# Patient Record
Sex: FEMALE | Race: WHITE | Employment: OTHER | URBAN - METROPOLITAN AREA
[De-identification: names, ages, dates, MRNs, and addresses within clinical notes are randomized per-mention and may not be internally consistent; named-entity substitution may affect disease eponyms.]

---

## 2017-01-09 ENCOUNTER — OFFICE VISIT (OUTPATIENT)
Dept: FAMILY MEDICINE | Facility: CLINIC | Age: 82
End: 2017-01-09
Payer: COMMERCIAL

## 2017-01-09 VITALS
HEIGHT: 66 IN | BODY MASS INDEX: 25.39 KG/M2 | DIASTOLIC BLOOD PRESSURE: 64 MMHG | WEIGHT: 158 LBS | TEMPERATURE: 97.5 F | HEART RATE: 84 BPM | SYSTOLIC BLOOD PRESSURE: 130 MMHG

## 2017-01-09 DIAGNOSIS — E03.9 ACQUIRED HYPOTHYROIDISM: ICD-10-CM

## 2017-01-09 DIAGNOSIS — G30.9 ALZHEIMER'S DEMENTIA WITHOUT BEHAVIORAL DISTURBANCE, UNSPECIFIED TIMING OF DEMENTIA ONSET: ICD-10-CM

## 2017-01-09 DIAGNOSIS — F02.80 ALZHEIMER'S DEMENTIA WITHOUT BEHAVIORAL DISTURBANCE, UNSPECIFIED TIMING OF DEMENTIA ONSET: ICD-10-CM

## 2017-01-09 DIAGNOSIS — E03.9 HYPOTHYROIDISM, UNSPECIFIED TYPE: Primary | ICD-10-CM

## 2017-01-09 LAB — HGB BLD-MCNC: 14.9 G/DL (ref 11.7–15.7)

## 2017-01-09 PROCEDURE — 99214 OFFICE O/P EST MOD 30 MIN: CPT | Performed by: FAMILY MEDICINE

## 2017-01-09 PROCEDURE — 84439 ASSAY OF FREE THYROXINE: CPT | Performed by: FAMILY MEDICINE

## 2017-01-09 PROCEDURE — 84443 ASSAY THYROID STIM HORMONE: CPT | Performed by: FAMILY MEDICINE

## 2017-01-09 PROCEDURE — 85018 HEMOGLOBIN: CPT | Performed by: FAMILY MEDICINE

## 2017-01-09 PROCEDURE — 36415 COLL VENOUS BLD VENIPUNCTURE: CPT | Performed by: FAMILY MEDICINE

## 2017-01-09 RX ORDER — LEVOTHYROXINE SODIUM 50 UG/1
50 TABLET ORAL DAILY
Qty: 90 TABLET | Refills: 2 | Status: SHIPPED | OUTPATIENT
Start: 2017-01-09

## 2017-01-09 NOTE — PROGRESS NOTES
SUBJECTIVE:                                                    Peggy Muniz is a 81 year old female who presents to clinic today for the following health issues:      Hypothyroidism Follow-up      Since last visit, patient describes the following symptoms: Weight stable, no hair loss, no skin changes, no constipation, no loose stools       Amount of exercise or physical activity: walks     Problems taking medications regularly: Yes,  problems remembering to take    Medication side effects: none    Diet: regular (no restrictions)    Here with her daughter  Taking meds regularly    Living in a senior place, doing well    Dementia-stable on aricept per daughter, no changes, no illnesses, eating well, not skipping meals, no mood changes          Problem list and histories reviewed & adjusted, as indicated.  Additional history: as documented    Patient Active Problem List   Diagnosis     Hypothyroidism     Benign neoplasm of colon     Other specified idiopathic peripheral neuropathy     Venous (peripheral) insufficiency     Hyperlipidemia LDL goal <130     Other diseases of nasal cavity and sinuses(478.19)     Shoulder pain     Ear problem     Advance Care Planning     Alzheimer's dementia without behavioral disturbance, unspecified timing of dementia onset     History reviewed. No pertinent past surgical history.    Social History   Substance Use Topics     Smoking status: Former Smoker     Quit date: 07/01/1989     Smokeless tobacco: Never Used     Alcohol Use: Yes      Comment: rarely     Family History   Problem Relation Age of Onset     Breast Cancer Mother      CANCER Mother      ovarian ca     C.A.D. Mother      CEREBROVASCULAR DISEASE Mother      CANCER Father      mouth     C.A.D. Brother      Hypertension Brother      Alzheimer Disease Brother      Respiratory Brother            ROS:  Constitutional, HEENT, cardiovascular, pulmonary, GI, , musculoskeletal, neuro, skin, endocrine and psych systems are  "negative, except as otherwise noted.    OBJECTIVE:                                                    /64 mmHg  Pulse 84  Temp(Src) 97.5  F (36.4  C) (Oral)  Ht 5' 5.5\" (1.664 m)  Wt 158 lb (71.668 kg)  BMI 25.88 kg/m2  Breastfeeding? No  Body mass index is 25.88 kg/(m^2).  GENERAL: healthy, alert and no distress  NECK: no adenopathy, no asymmetry, masses, or scars and thyroid normal to palpation  RESP: lungs clear to auscultation - no rales, rhonchi or wheezes  CV: regular rate and rhythm, normal S1 S2, no S3 or S4, no murmur, click or rub, no peripheral edema and peripheral pulses strong  ABDOMEN: soft, nontender, no hepatosplenomegaly, no masses and bowel sounds normal  MS: no gross musculoskeletal defects noted, no edema    Diagnostic Test Results:  Results for orders placed or performed in visit on 01/09/17   Hemoglobin   Result Value Ref Range    Hemoglobin 14.9 11.7 - 15.7 g/dL        ASSESSMENT/PLAN:                                                        ICD-10-CM    1. Hypothyroidism, unspecified type E03.9 TSH with free T4 reflex     Hemoglobin   2. Acquired hypothyroidism E03.9 levothyroxine (SYNTHROID) 50 MCG tablet   3. Alzheimer's dementia without behavioral disturbance, unspecified timing of dementia onset G30.9     F02.80      Hypothyroidism-stable symptoms, advised taking meds regularly, family does not want to change dose, unless it is severely abnormal  Dementia-stable, on Aricept, cont meds for now  Follow up in 6-12 ,months    See Patient Instructions    Tim Keenan DO  [facility]    "

## 2017-01-09 NOTE — PATIENT INSTRUCTIONS
Continue med as planned  Recheck in 6-12 months  Follow up if having any symptoms  Tim Keenan D.O.

## 2017-01-09 NOTE — MR AVS SNAPSHOT
After Visit Summary   1/9/2017    Peggy Muniz    MRN: [id number]           Patient Information     Date Of Birth          1935        Visit Information        Provider Department      1/9/2017 10:00 AM Tim Keenan DO [facility]        Today's Diagnoses     Hypothyroidism, unspecified type    -  1     Acquired hypothyroidism         Alzheimer's dementia without behavioral disturbance, unspecified timing of dementia onset           Care Instructions    Continue med as planned  Recheck in 6-12 months  Follow up if having any symptoms  Tim Keenan D.O.          Follow-ups after your visit        Who to contact     If you have questions or need follow up information about today's clinic visit or your schedule please contact [facility] directly at [phone].  Normal or non-critical lab and imaging results will be communicated to you by MyChart, letter or phone within 4 business days after the clinic has received the results. If you do not hear from us within 7 days, please contact the clinic through MyChart or phone. If you have a critical or abnormal lab result, we will notify you by phone as soon as possible.  Submit refill requests through [employer] or call your pharmacy and they will forward the refill request to us. Please allow 3 business days for your refill to be completed.          Additional Information About Your Visit        MyChart Information     [employer] gives you secure access to your electronic health record. If you see a primary care provider, you can also send messages to your care team and make appointments. If you have questions, please call your primary care clinic.  If you do not have a primary care provider, please call [phone] and they will assist you.        Care EveryWhere ID     This is your Care EveryWhere ID. This could be used by other organizations to access your [facility] medical records  [id number]       "  Your Vitals Were     Pulse Temperature Height BMI (Body Mass Index) Breastfeeding?       84 97.5  F (36.4  C) (Oral) 5' 5.5\" (1.664 m) 25.88 kg/m2 No        Blood Pressure from Last 3 Encounters:   01/09/17 130/64   08/17/16 114/60   05/20/16 131/62    Weight from Last 3 Encounters:   01/09/17 158 lb (71.668 kg)   08/17/16 167 lb 8 oz (75.978 kg)   05/20/16 165 lb 3.2 oz (74.934 kg)              We Performed the Following     Hemoglobin     TSH with free T4 reflex          Today's Medication Changes          These changes are accurate as of: 1/9/17 10:28 AM.  If you have any questions, ask your nurse or doctor.               Stop taking these medicines if you haven't already. Please contact your care team if you have questions.     RESTASIS 0.05 % ophthalmic emulsion   Generic drug:  cycloSPORINE   Stopped by:  Tim Keenan, DO                Where to get your medicines      These medications were sent to [id number] - [facility] [address]  [address] [address]     Phone:  [phone]    - levothyroxine 50 MCG tablet             Primary Care Provider Office Phone # Fax #    Ciarra Whitt [phone]       [facility] [address] [place] [id number]        Thank you!     Thank you for choosing [facility]  for your care. Our goal is always to provide you with excellent care. Hearing back from our patients is one way we can continue to improve our services. Please take a few minutes to complete the written survey that you may receive in the mail after your visit with us. Thank you!             Your Updated Medication List - Protect others around you: Learn how to safely use, store and throw away your medicines at www.disposemymeds.org.          This list is accurate as of: 1/9/17 10:28 AM.  Always use your most recent med list.                   Brand Name Dispense Instructions for use    " CLARITIN-D 24 HOUR  MG per 24 hr tablet   Generic drug:  loratadine-pseudoePHEDrine     30    1 tab po QD (Once per day) as needed for allergy symptoms       * donepezil 10 MG tablet    ARICEPT    30 tablet    Take 1 tablet (10 mg) by mouth At Bedtime       * donepezil 5 MG tablet    ARICEPT    30 tablet    Take 1 tablet (5 mg) by mouth At Bedtime       levothyroxine 50 MCG tablet    SYNTHROID    90 tablet    Take 1 tablet (50 mcg) by mouth daily Go to 1/9 appt for refills!       multivitamin Tabs tablet      1 tablet daily       zoster vaccine live (PF) injection    ZOSTAVAX    78754 Units    Inject 19,400 Units Subcutaneous once for 1 dose.       * Notice:  This list has 2 medication(s) that are the same as other medications prescribed for you. Read the directions carefully, and ask your doctor or other care provider to review them with you.

## 2017-01-09 NOTE — NURSING NOTE
"Chief Complaint   Patient presents with     Chronic Disease Management     thyroids        Initial /64 mmHg  Pulse 84  Temp(Src) 97.5  F (36.4  C) (Oral)  Ht 5' 5.5\" (1.664 m)  Wt 158 lb (71.668 kg)  BMI 25.88 kg/m2  Breastfeeding? No Estimated body mass index is 25.88 kg/(m^2) as calculated from the following:    Height as of this encounter: 5' 5.5\" (1.664 m).    Weight as of this encounter: 158 lb (71.668 kg).  BP completed using cuff size: anthony Fernandez CMA (AAMA)      "

## 2017-01-10 LAB
T4 FREE SERPL-MCNC: 1.1 NG/DL (ref 0.76–1.46)
TSH SERPL DL<=0.005 MIU/L-ACNC: 7.12 MU/L (ref 0.4–4)

## 2017-04-13 ENCOUNTER — TELEPHONE (OUTPATIENT)
Dept: FAMILY MEDICINE | Facility: CLINIC | Age: 82
End: 2017-04-13

## 2017-04-13 NOTE — TELEPHONE ENCOUNTER
Forms received from [employer] for Hellina Shlomo DO.  Forms placed in provider 'sign me' folder.  Please fax forms to [phone] after completion.    Petrona Hines  Patient Representative

## 2017-04-14 NOTE — TELEPHONE ENCOUNTER
Form completed and faxed.    Jennifer GARRISON, Certified Medical Assistant (AAMA)April 14, 2017 3:26 PM

## 2017-04-19 ENCOUNTER — TELEPHONE (OUTPATIENT)
Dept: FAMILY MEDICINE | Facility: CLINIC | Age: 82
End: 2017-04-19

## 2017-04-19 NOTE — TELEPHONE ENCOUNTER
Physician orders received.  Given to Josefina Sandra RN for med rec.  Please give to provider for review and signature upon completion.    Fax to: [phone]      Petrona Hines  Patient Representative

## 2017-04-21 ENCOUNTER — MEDICAL CORRESPONDENCE (OUTPATIENT)
Dept: HEALTH INFORMATION MANAGEMENT | Facility: CLINIC | Age: 82
End: 2017-04-21

## 2017-04-21 NOTE — TELEPHONE ENCOUNTER
[facility]    Jennifer GARRISON, Certified Medical Assistant (AAMA)April 21, 2017 4:14 PM

## 2017-04-24 ENCOUNTER — TELEPHONE (OUTPATIENT)
Dept: FAMILY MEDICINE | Facility: CLINIC | Age: 82
End: 2017-04-24

## 2017-04-25 ENCOUNTER — TELEPHONE (OUTPATIENT)
Dept: FAMILY MEDICINE | Facility: CLINIC | Age: 82
End: 2017-04-25

## 2017-04-25 NOTE — TELEPHONE ENCOUNTER
Reason for Call:  Other appointment    Detailed comments: She is calling to speak with a nurse about the appointment on 5/2. They would like to get her in sooner. She would also like someone who know a lot about dementia.     Phone Number Patient can be reached at: Home number on file [phone] (home)    Best Time: Anytime     Can we leave a detailed message on this number? YES    Call taken on 4/25/2017 at 4:15 PM by Fany Pearce

## 2017-04-25 NOTE — TELEPHONE ENCOUNTER
Informed ABY [facility]. She states niece needs to bring her, left a VM with Rae with our phone number & the scheduling number.  Gemini Poole RN

## 2017-04-26 ENCOUNTER — OFFICE VISIT (OUTPATIENT)
Dept: FAMILY MEDICINE | Facility: CLINIC | Age: 82
End: 2017-04-26
Payer: COMMERCIAL

## 2017-04-26 VITALS
HEART RATE: 80 BPM | BODY MASS INDEX: 24.91 KG/M2 | TEMPERATURE: 98.3 F | SYSTOLIC BLOOD PRESSURE: 130 MMHG | HEIGHT: 66 IN | DIASTOLIC BLOOD PRESSURE: 64 MMHG | WEIGHT: 155 LBS

## 2017-04-26 DIAGNOSIS — F02.81 ALZHEIMER'S DEMENTIA WITH BEHAVIORAL DISTURBANCE, UNSPECIFIED TIMING OF DEMENTIA ONSET: Primary | ICD-10-CM

## 2017-04-26 DIAGNOSIS — G30.9 ALZHEIMER'S DEMENTIA WITH BEHAVIORAL DISTURBANCE, UNSPECIFIED TIMING OF DEMENTIA ONSET: Primary | ICD-10-CM

## 2017-04-26 PROCEDURE — 99214 OFFICE O/P EST MOD 30 MIN: CPT | Performed by: PHYSICIAN ASSISTANT

## 2017-04-26 RX ORDER — SERTRALINE HYDROCHLORIDE 25 MG/1
25 TABLET, FILM COATED ORAL DAILY
Qty: 30 TABLET | Refills: 1 | Status: SHIPPED | OUTPATIENT
Start: 2017-04-26 | End: 2017-05-02 | Stop reason: DRUGHIGH

## 2017-04-26 NOTE — NURSING NOTE
"Chief Complaint   Patient presents with     Behavioral Problem     Aggressive Behavior       Initial /64 (Cuff Size: Adult Regular)  Pulse 80  Temp 98.3  F (36.8  C) (Oral)  Ht 5' 5.5\" (1.664 m)  Wt 155 lb (70.3 kg)  BMI 25.4 kg/m2 Estimated body mass index is 25.4 kg/(m^2) as calculated from the following:    Height as of this encounter: 5' 5.5\" (1.664 m).    Weight as of this encounter: 155 lb (70.3 kg).  Medication Reconciliation: complete   Nora Yuan, Certified Medical Assistant (AAMA)     "
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
## 2017-04-26 NOTE — MR AVS SNAPSHOT
After Visit Summary   4/26/2017    Peggy Muniz    MRN: [id number]           Patient Information     Date Of Birth          1935        Visit Information        Provider Department      4/26/2017 12:20 PM Nancy Dorman PA-C [facility]        Today's Diagnoses     Alzheimer's dementia without behavioral disturbance, unspecified timing of dementia onset    -  1    Agitation           Follow-ups after your visit        Additional Services     CARE COORDINATION REFERRAL       Services are provided by a Care Coordinator for people with complex needs such as: medical, social, or financial troubles.  The Care Coordinator works with the patient and their Primary Care Provider to determine health goals, obtain resources, achieve outcomes, and develop care plans that help coordinate the patient's care.     Nurse's name is TERE Burnett Director, [facility] [phone]  Cell [phone]  Reason for Referral: Caregiver Concerns and Mental Status/Behavioral Changes    Provide additional details for Care Coordination to best meet the patient's current needs: Peggy is a new resident at [facility]. Is having aggressive/violent behavior with her move.   This is new with her move, which can occur when surroundings are changed, etc.  Nurse would like to find a Geriatrician that she can take Peggy too to manage these new behaviors.  I had also suggested OT/PT at the nursing home as with aggression aren't always helpful, rather behavior modifications, sensory and music therapy, etc.  Any help with geriatrician or other AD resources would be helpful    Clinical Staff have discussed the Care Coordination Referral with the patient and/or caregiver: yes                  Who to contact     If you have questions or need follow up information about today's clinic visit or your schedule please contact [facility] directly at [phone].  Normal or non-critical  "lab and imaging results will be communicated to you by MyChart, letter or phone within 4 business days after the clinic has received the results. If you do not hear from us within 7 days, please contact the clinic through [employer] or phone. If you have a critical or abnormal lab result, we will notify you by phone as soon as possible.  Submit refill requests through [employer] or call your pharmacy and they will forward the refill request to us. Please allow 3 business days for your refill to be completed.          Additional Information About Your Visit        [employer] Information     [employer] gives you secure access to your electronic health record. If you see a primary care provider, you can also send messages to your care team and make appointments. If you have questions, please call your primary care clinic.  If you do not have a primary care provider, please call [phone] and they will assist you.        Care EveryWhere ID     This is your Care EveryWhere ID. This could be used by other organizations to access your [facility] medical records  [id number]        Your Vitals Were     Pulse Temperature Height BMI (Body Mass Index)          80 98.3  F (36.8  C) (Oral) 5' 5.5\" (1.664 m) 25.4 kg/m2         Blood Pressure from Last 3 Encounters:   04/26/17 130/64   01/09/17 130/64   08/17/16 114/60    Weight from Last 3 Encounters:   04/26/17 155 lb (70.3 kg)   01/09/17 158 lb (71.7 kg)   08/17/16 167 lb 8 oz (76 kg)              We Performed the Following     CARE COORDINATION REFERRAL          Today's Medication Changes          These changes are accurate as of: 4/26/17  2:15 PM.  If you have any questions, ask your nurse or doctor.               Start taking these medicines.        Dose/Directions    sertraline 25 MG tablet   Commonly known as:  ZOLOFT   Used for:  Alzheimer's dementia without behavioral disturbance, unspecified timing of dementia onset, Agitation   Started by:  Nancy Dorman PA-C        Dose: "  25 mg   Take 1 tablet (25 mg) by mouth daily   Quantity:  30 tablet   Refills:  1            Where to get your medicines      These medications were sent to [facility][id number] - [place], [place] - [address]  [address] [place] [address]     Phone:  [phone]     sertraline 25 MG tablet                Primary Care Provider Office Phone # Fax #    Tim Keenan -[phone]       [facility] [address] [address]        Thank you!     Thank you for choosing [facility]  for your care. Our goal is always to provide you with excellent care. Hearing back from our patients is one way we can continue to improve our services. Please take a few minutes to complete the written survey that you may receive in the mail after your visit with us. Thank you!             Your Updated Medication List - Protect others around you: Learn how to safely use, store and throw away your medicines at www.disposemymeds.org.          This list is accurate as of: 4/26/17  2:15 PM.  Always use your most recent med list.                   Brand Name Dispense Instructions for use    donepezil 5 MG tablet    ARICEPT    30 tablet    Take 1 tablet (5 mg) by mouth At Bedtime       levothyroxine 50 MCG tablet    SYNTHROID    90 tablet    Take 1 tablet (50 mcg) by mouth daily Go to 1/9 appt for refills!       sertraline 25 MG tablet    ZOLOFT    30 tablet    Take 1 tablet (25 mg) by mouth daily

## 2017-04-28 ENCOUNTER — TELEPHONE (OUTPATIENT)
Dept: FAMILY MEDICINE | Facility: CLINIC | Age: 82
End: 2017-04-28

## 2017-05-01 ENCOUNTER — TELEPHONE (OUTPATIENT)
Dept: FAMILY MEDICINE | Facility: CLINIC | Age: 82
End: 2017-05-01

## 2017-05-01 DIAGNOSIS — G30.9 ALZHEIMER'S DEMENTIA WITH BEHAVIORAL DISTURBANCE, UNSPECIFIED TIMING OF DEMENTIA ONSET: Primary | ICD-10-CM

## 2017-05-01 DIAGNOSIS — F02.81 ALZHEIMER'S DEMENTIA WITH BEHAVIORAL DISTURBANCE, UNSPECIFIED TIMING OF DEMENTIA ONSET: Primary | ICD-10-CM

## 2017-05-01 NOTE — TELEPHONE ENCOUNTER
"Called and spoke with Rae. She states that the zoloft is not working. The doctor at the home says that it is strong enough and needs an increased dose or something else because 25 mg is \"not enough\".       Iftikhar Monroe RN    "

## 2017-05-01 NOTE — TELEPHONE ENCOUNTER
Reason for Call:  Other prescription    Detailed comments: Rae called to see if the patient's medications can either be upped or changed due to it not working for her.    Phone Number Patient can be reached at: Other phone number:  [phone]    Best Time: anytime    Can we leave a detailed message on this number? YES    Call taken on 5/1/2017 at 4:04 PM by Nancy Nath

## 2017-05-02 NOTE — TELEPHONE ENCOUNTER
Called and spoke with Jacqueline and Rae, gave them info below. Jacqueline states that it is a possibility to switch to memory care but Jacqueline believes that this is the best first step before switching her to memory care. They are also trying to get a geriatric NP to see her there.    Iftikhar Monroe RN

## 2017-05-02 NOTE — TELEPHONE ENCOUNTER
Okay to increase this to 50 mg per day if she is otherwise tolerating this okay. These medications will take time.  1 week is not even close to enough time to see benefit.   is going to try to call them again today.    Please ask nurse if transition to Memory care is an option for the patient.    SANTO Cid, PA-C

## 2017-05-02 NOTE — TELEPHONE ENCOUNTER
Forms received from [employer] for Nancy Dorman PA-C.  Forms placed in provider 'sign me' folder.  Please fax forms to [phone] after completion.    Loreta Gutierrze,

## 2017-05-16 DIAGNOSIS — F03.A0 MILD DEMENTIA (H): ICD-10-CM

## 2017-05-16 NOTE — TELEPHONE ENCOUNTER
Pharmacy Comments:   2nd Request!  5/16/17    donepezil (ARICEPT) 5 MG tablet   5 mg, AT BEDTIME 11 ordered  EditCancel Reorder     Summary: Take 1 tablet (5 mg) by mouth At Bedtime, Disp-30 tablet, R-11, E-Prescribe   Dose, Route, Frequency: 5 mg, Oral, AT BEDTIME  Start: 5/20/2016  Ord/Sold: 5/20/2016 (O)  Report  Taking:   Long-term:   Pharmacy: [facility][id number] - [address] Dose History       Patient Sig: Take 1 tablet (5 mg) by mouth At Bedtime       Ordered on: 5/20/2016       Authorized by: MADELIN TAN       Dispense: 30 tablet          Last Office Visit with TONIE, [facility] or [facility] prescribing provider: 4-                                                     Pharmacy Comments:   2nd Request!  5/16/17    levothyroxine (SYNTHROID) 25 MCG tablet      Last Written Prescription Date:  na  Last Fill Quantity: na,   # refills: na  Last Office Visit with [facility], [facility] or [facility] prescribing provider: 4-  Future Office visit:       Routing refill request to provider for review/approval because:  Drug not active on patient's medication list

## 2017-05-17 RX ORDER — DONEPEZIL HYDROCHLORIDE 5 MG/1
5 TABLET, FILM COATED ORAL AT BEDTIME
Qty: 90 TABLET | Refills: 0 | Status: SHIPPED | OUTPATIENT
Start: 2017-05-17

## 2017-05-17 NOTE — TELEPHONE ENCOUNTER
Refilled Aricept.   Levothyroxine was changed to 50mg and should have refills on file.     Pratibha Nguyen RN

## 2017-05-25 ENCOUNTER — CARE COORDINATION (OUTPATIENT)
Dept: CARE COORDINATION | Facility: CLINIC | Age: 82
End: 2017-05-25

## 2017-05-25 NOTE — PROGRESS NOTES
Clinic Care Coordination Contact--Social Work Initial Call/Assessment   Care Team Conversations    Psychosocial: Care Coordination referral from PCP.  Per referral, Patient recently moved to [facility], she is exhibiting aggression with recent move.  [facility] (Jacqueline Moseley) requesting assistance with finding a geriatric provider that can manage these new behaviors.  ANDREW reviewed chart.  ANDREW left message for Jacqueline Moseley RN, Life Care Manager at [facility] ([phone]) apologizing for not calling sooner.  ANDREW informed of a therapist that specializes with geriatric needs at [facility] and provided this #.  SW suggested Patient's insurance be called to find in network geriatrician for Patient.  Patient's niece is listed as all contacts, noting all correspondence/calls are to be directed to her as she is Power of .  There is not a SAMIR on file, it is uncertain if Patient would comprehend this form if signed due to diagnosis of Alzheimer's/dementia.      Plan: 1) SW will await call from Life Care Manager before proceeding     Dipti Garcia, CORNELIO, MSW   [phone]  5/25/2017 4:08 PM    Clinic Care Coordination Contact--Social Work Initial Call/Assessment   Care Team Conversations    Psychosocial: Care Coordination referral from PCP.  Per referral, Patient recently moved to [facility], she is exhibiting aggression with recent move.  Life Care Manager from [facility] (Jacqueline Moseley) requesting assistance with finding a geriatric provider that can manage these new behaviors.  ANDREW reviewed chart.  ANDREW left messages for Jacqueline Moseley RN, Life Care Manager at [facility] ([phone]) apologizing for not calling sooner.  ANDREW informed of a therapist that specializes with geriatric needs at [facility] and provided this #.  SW suggested Patient's insurance be called to find in network geriatrician for Patient.  Patient's niece is  listed as all contacts, noting all correspondence/calls are to be directed to her as she is Power of .  There is not a SAMIR on file, it is uncertain if Patient would comprehend this form if signed due to diagnosis of Alzheimer's/dementia.      ANDREW left message again today for Jacqueline at [facility] requesting return call.  ANDREW provided her contact information.      Plan: 1) ANDREW will not make further attempt, Jacqueline is already working with Patient and family.  Will update PCP    CORNELIO Marrero, MSW   [phone]  7/6/2017 3:54 PM

## 2018-03-22 ENCOUNTER — RECORDS - HEALTHEAST (OUTPATIENT)
Dept: LAB | Facility: CLINIC | Age: 83
End: 2018-03-22

## 2018-03-22 LAB
ANION GAP SERPL CALCULATED.3IONS-SCNC: 11 MMOL/L (ref 5–18)
BUN SERPL-MCNC: 10 MG/DL (ref 8–28)
CALCIUM SERPL-MCNC: 8.6 MG/DL (ref 8.5–10.5)
CHLORIDE BLD-SCNC: 108 MMOL/L (ref 98–107)
CO2 SERPL-SCNC: 22 MMOL/L (ref 22–31)
CREAT SERPL-MCNC: 0.74 MG/DL (ref 0.6–1.1)
ERYTHROCYTE [DISTWIDTH] IN BLOOD BY AUTOMATED COUNT: 17.7 % (ref 11–14.5)
GFR SERPL CREATININE-BSD FRML MDRD: >60 ML/MIN/1.73M2
GLUCOSE BLD-MCNC: 92 MG/DL (ref 70–125)
HCT VFR BLD AUTO: 26.5 % (ref 35–47)
HGB BLD-MCNC: 7.3 G/DL (ref 12–16)
MCH RBC QN AUTO: 20 PG (ref 27–34)
MCHC RBC AUTO-ENTMCNC: 27.5 G/DL (ref 32–36)
MCV RBC AUTO: 73 FL (ref 80–100)
PLATELET # BLD AUTO: 579 THOU/UL (ref 140–440)
PMV BLD AUTO: 10 FL (ref 8.5–12.5)
POTASSIUM BLD-SCNC: 4.1 MMOL/L (ref 3.5–5)
RBC # BLD AUTO: 3.65 MILL/UL (ref 3.8–5.4)
SODIUM SERPL-SCNC: 141 MMOL/L (ref 136–145)
TSH SERPL DL<=0.005 MIU/L-ACNC: 4.25 UIU/ML (ref 0.3–5)
WBC: 16.3 THOU/UL (ref 4–11)

## 2018-03-23 ENCOUNTER — TRANSFERRED RECORDS (OUTPATIENT)
Dept: HEALTH INFORMATION MANAGEMENT | Facility: CLINIC | Age: 83
End: 2018-03-23

## 2018-03-26 ENCOUNTER — TELEPHONE (OUTPATIENT)
Dept: FAMILY MEDICINE | Facility: CLINIC | Age: 83
End: 2018-03-26

## 2018-03-26 NOTE — TELEPHONE ENCOUNTER
This patient was discharged from [facility] on 03/24/2018.    Discharge Diagnosis: microcytic anemia. Most likely due to GI losses.    Follow-up instructions: she should see Dr Keenan in 5-7 days.    A follow-up visit has not been scheduled.      Number of ED/ER visits in the last 12 months:  0     Please follow-up with patient.    Petrona Hines

## 2018-03-26 NOTE — TELEPHONE ENCOUNTER
Patient's relative, Yaa, calls [facility]. She states that patient is in assisted living and doing well. She can be reached at [phone] if needed.    Iftikhar Monroe RN

## 2018-04-03 ENCOUNTER — TRANSFERRED RECORDS (OUTPATIENT)
Dept: HEALTH INFORMATION MANAGEMENT | Facility: CLINIC | Age: 83
End: 2018-04-03

## 2018-04-06 ENCOUNTER — TELEPHONE (OUTPATIENT)
Dept: FAMILY MEDICINE | Facility: CLINIC | Age: 83
End: 2018-04-06

## 2018-04-06 NOTE — TELEPHONE ENCOUNTER
This patient was discharged from [facility] on 04/05/2018.    Discharge Diagnosis: fall    Follow-up instructions: Follow up with PCP 1 to 5 days    A follow-up visit has not been scheduled.      Number of ED/ER visits in the last 12 months:  2     Please follow-up with patient.    Petrona Hines

## 2018-04-06 NOTE — TELEPHONE ENCOUNTER
PO received.  Given to Josefina Sandra RN for med rec.  Please give to provider for review and signature upon completion.    Fax to: [phone]      Petrona Hines  Patient Representative       DISPLAY PLAN FREE TEXT

## 2018-04-06 NOTE — TELEPHONE ENCOUNTER
Patient was discharged to [facility] with 1:1 memory care, will postpone.  Gemini Poole RN

## 2018-04-11 ENCOUNTER — RECORDS - HEALTHEAST (OUTPATIENT)
Dept: LAB | Facility: CLINIC | Age: 83
End: 2018-04-11

## 2018-04-12 ENCOUNTER — TELEPHONE (OUTPATIENT)
Dept: FAMILY MEDICINE | Facility: CLINIC | Age: 83
End: 2018-04-12

## 2018-04-12 LAB
ERYTHROCYTE [DISTWIDTH] IN BLOOD BY AUTOMATED COUNT: 22.4 % (ref 11–14.5)
HCT VFR BLD AUTO: 30.2 % (ref 35–47)
HGB BLD-MCNC: 8.5 G/DL (ref 12–16)
MCH RBC QN AUTO: 21.5 PG (ref 27–34)
MCHC RBC AUTO-ENTMCNC: 28.1 G/DL (ref 32–36)
MCV RBC AUTO: 77 FL (ref 80–100)
PLATELET # BLD AUTO: 653 THOU/UL (ref 140–440)
PMV BLD AUTO: 9.8 FL (ref 8.5–12.5)
RBC # BLD AUTO: 3.95 MILL/UL (ref 3.8–5.4)
WBC: 13.7 THOU/UL (ref 4–11)

## 2018-04-12 RX ORDER — TRIAMCINOLONE ACETONIDE 1 MG/G
OINTMENT TOPICAL
COMMUNITY
Start: 2018-04-12

## 2018-04-12 RX ORDER — QUETIAPINE FUMARATE 25 MG/1
12.5 TABLET, FILM COATED ORAL
COMMUNITY
Start: 2018-04-12

## 2018-04-12 RX ORDER — AMOXICILLIN 250 MG
1 CAPSULE ORAL EVERY OTHER DAY
COMMUNITY
Start: 2018-04-12

## 2018-04-12 RX ORDER — SENNOSIDES A AND B 8.6 MG/1
1 TABLET, FILM COATED ORAL DAILY PRN
COMMUNITY
Start: 2018-04-12

## 2018-04-12 RX ORDER — FERROUS SULFATE 325(65) MG
325 TABLET, DELAYED RELEASE (ENTERIC COATED) ORAL DAILY
COMMUNITY
Start: 2018-04-12

## 2018-04-12 NOTE — TELEPHONE ENCOUNTER
[facility]/ physician Orders - Annual orders - medication orders received.  Given to Josefina Sandra RN for med rec.  Please give to provider for review and signature upon completion.    Fax to: [phone]      Petrona Hines  Patient Representative

## 2018-04-12 NOTE — TELEPHONE ENCOUNTER
Updated med rec. She was having a hard time adjusting to the memory care unit but is better now. This is long term and they have their own providers.   Gemini Poole RN

## 2018-04-13 NOTE — TELEPHONE ENCOUNTER
I have not seen patient more than 1 year. And only seen her 2 times in the past.  Patient moved to [place].   Please have her establish care with provider  Tim Keenan D.O.

## 2018-05-10 ENCOUNTER — RECORDS - HEALTHEAST (OUTPATIENT)
Dept: LAB | Facility: CLINIC | Age: 83
End: 2018-05-10

## 2018-05-10 LAB
HGB BLD-MCNC: 7 G/DL (ref 12–16)
TSH SERPL DL<=0.005 MIU/L-ACNC: 2.41 UIU/ML (ref 0.3–5)